# Patient Record
Sex: MALE | Race: WHITE | NOT HISPANIC OR LATINO | Employment: FULL TIME | URBAN - METROPOLITAN AREA
[De-identification: names, ages, dates, MRNs, and addresses within clinical notes are randomized per-mention and may not be internally consistent; named-entity substitution may affect disease eponyms.]

---

## 2018-01-29 ENCOUNTER — OFFICE VISIT (OUTPATIENT)
Dept: URGENT CARE | Facility: CLINIC | Age: 73
End: 2018-01-29
Payer: COMMERCIAL

## 2018-01-29 VITALS
HEIGHT: 69 IN | TEMPERATURE: 98 F | RESPIRATION RATE: 18 BRPM | WEIGHT: 194 LBS | OXYGEN SATURATION: 97 % | SYSTOLIC BLOOD PRESSURE: 133 MMHG | BODY MASS INDEX: 28.73 KG/M2 | HEART RATE: 79 BPM | DIASTOLIC BLOOD PRESSURE: 91 MMHG

## 2018-01-29 DIAGNOSIS — R05.9 COUGH: ICD-10-CM

## 2018-01-29 DIAGNOSIS — B34.9 VIRAL SYNDROME: Primary | ICD-10-CM

## 2018-01-29 LAB
CTP QC/QA: YES
FLUAV AG NPH QL: NEGATIVE
FLUBV AG NPH QL: NEGATIVE

## 2018-01-29 PROCEDURE — 87804 INFLUENZA ASSAY W/OPTIC: CPT | Mod: QW,S$GLB,, | Performed by: FAMILY MEDICINE

## 2018-01-29 PROCEDURE — 99203 OFFICE O/P NEW LOW 30 MIN: CPT | Mod: S$GLB,,, | Performed by: FAMILY MEDICINE

## 2018-01-29 RX ORDER — ASPIRIN 81 MG/1
81 TABLET ORAL DAILY
COMMUNITY

## 2018-01-29 RX ORDER — OSELTAMIVIR PHOSPHATE 75 MG/1
75 CAPSULE ORAL 2 TIMES DAILY
Qty: 10 CAPSULE | Refills: 0 | Status: SHIPPED | OUTPATIENT
Start: 2018-01-29 | End: 2018-02-03

## 2018-01-29 RX ORDER — CODEINE PHOSPHATE AND GUAIFENESIN 10; 100 MG/5ML; MG/5ML
5 SOLUTION ORAL 3 TIMES DAILY PRN
Qty: 120 ML | Refills: 0 | Status: SHIPPED | OUTPATIENT
Start: 2018-01-29 | End: 2018-02-05

## 2018-01-29 NOTE — PATIENT INSTRUCTIONS
"  Viral Syndrome (Adult)  A viral illness may cause a number of symptoms. The symptoms depend on the part of the body that the virus affects. If it settles in your nose, throat, and lungs, it may cause cough, sore throat, congestion, and sometimes headache. If it settles in your stomach and intestinal tract, it may cause vomiting and diarrhea. Sometimes it causes vague symptoms like "aching all over," feeling tired, loss of appetite, or fever.  A viral illness usually lasts 1 to 2 weeks, but sometimes it lasts longer. In some cases, a more serious infection can look like a viral syndrome in the first few days of the illness. You may need another exam and additional tests to know the difference. Watch for the warning signs listed below.  Home care  Follow these guidelines for taking care of yourself at home:  · If symptoms are severe, rest at home for the first 2 to 3 days.  · Stay away from cigarette smoke - both your smoke and the smoke from others.  · You may use over-the-counter acetaminophen or ibuprofen for fever, muscle aching, and headache, unless another medicine was prescribed for this. If you have chronic liver or kidney disease or ever had a stomach ulcer or GI bleeding, talk with your doctor before using these medicines. No one who is younger than 18 and ill with a fever should take aspirin. It may cause severe disease or death.  · Your appetite may be poor, so a light diet is fine. Avoid dehydration by drinking 8 to 12 8-ounce glasses of fluids each day. This may include water; orange juice; lemonade; apple, grape, and cranberry juice; clear fruit drinks; electrolyte replacement and sports drinks; and decaffeinated teas and coffee. If you have been diagnosed with a kidney disease, ask your doctor how much and what types of fluids you should drink to prevent dehydration. If you have kidney disease, drinking too much fluid can cause it build up in the your body and be dangerous to your " health.  · Over-the-counter remedies won't shorten the length of the illness but may be helpful for cough, sore throat; and nasal and sinus congestion. Don't use decongestants if you have high blood pressure.  Follow-up care  Follow up with your healthcare provider if you do not improve over the next week.  Call 911  Get emergency medical care if any of the following occur:  · Convulsion  · Feeling weak, dizzy, or like you are going to faint  · Chest pain, shortness of breath, wheezing, or difficulty breathing  When to seek medical advice  Call your healthcare provider right away if any of these occur:  · Cough with lots of colored sputum (mucus) or blood in your sputum  · Chest pain, shortness of breath, wheezing, or difficulty breathing  · Severe headache; face, neck, or ear pain  · Severe, constant pain in the lower right side of your belly (abdominal)  · Continued vomiting (cant keep liquids down)  · Frequent diarrhea (more than 5 times a day); blood (red or black color) or mucus in diarrhea  · Feeling weak, dizzy, or like you are going to faint  · Extreme thirst  · Fever of 100.4°F (38°C) or higher, or as directed by your healthcare provider  Date Last Reviewed: 9/25/2015  © 4923-0926 [employer]. [address] [place] [address], [place] [address]. All rights reserved. This information is not intended as a substitute for professional medical care. Always follow your healthcare professional's instructions.    Follow up with your doctor in a few days as needed.  Return to the urgent care or go to the ER if symptoms get worse.    Antwan Edwards MD

## 2018-01-29 NOTE — PROGRESS NOTES
"Subjective:       Patient ID: Alfonso Plascencia is a 72 y.o. male.    Vitals:  height is 5' 9" (1.753 m) and weight is 88 kg (194 lb). His oral temperature is 98.4 °F (36.9 °C). His blood pressure is 133/91 (abnormal) and his pulse is 79. His respiration is 18 and oxygen saturation is 97%.     Chief Complaint: URI    URI    This is a new problem. The current episode started in the past 7 days. The problem has been gradually worsening. There has been no fever. Associated symptoms include congestion, coughing, joint pain, rhinorrhea and sneezing. Pertinent negatives include no abdominal pain, chest pain, diarrhea, ear pain, headaches, joint swelling, nausea, neck pain, plugged ear sensation, rash, sinus pain, sore throat, swollen glands, vomiting or wheezing. Treatments tried: Delsym, benadryl. The treatment provided no relief.     Review of Systems   Constitution: Positive for weakness and malaise/fatigue. Negative for chills and fever.   HENT: Positive for congestion, rhinorrhea and sneezing. Negative for ear discharge, ear pain, hoarse voice, sinus pain and sore throat.    Eyes: Negative for discharge, pain and redness.   Cardiovascular: Negative for chest pain, dyspnea on exertion and leg swelling.   Respiratory: Positive for cough. Negative for shortness of breath, sputum production and wheezing.    Skin: Negative for rash.   Musculoskeletal: Positive for joint pain. Negative for myalgias and neck pain.   Gastrointestinal: Negative for abdominal pain, constipation, diarrhea, nausea and vomiting.   Neurological: Negative for headaches.       Objective:      Physical Exam   Constitutional: He is oriented to person, place, and time. He appears well-developed and well-nourished.   HENT:   Head: Normocephalic and atraumatic.   Right Ear: External ear normal.   Left Ear: External ear normal.   Mouth/Throat: Oropharynx is clear and moist.   Eyes: EOM are normal. Pupils are equal, round, and reactive to light.   Neck: Normal " range of motion. Neck supple. No JVD present. No tracheal deviation present. No thyromegaly present.   Cardiovascular: Normal rate, regular rhythm and normal heart sounds.  Exam reveals no gallop and no friction rub.    No murmur heard.  Pulmonary/Chest: Breath sounds normal. No respiratory distress. He has no wheezes. He has no rales. He exhibits no tenderness.   Abdominal: Soft. Bowel sounds are normal. He exhibits no distension and no mass. There is no tenderness. There is no rebound and no guarding. No hernia.   Musculoskeletal: Normal range of motion. He exhibits no edema, tenderness or deformity.   Lymphadenopathy:     He has no cervical adenopathy.   Neurological: He is alert and oriented to person, place, and time. He displays normal reflexes. No cranial nerve deficit. He exhibits normal muscle tone. Coordination normal.   Skin: Skin is warm. Capillary refill takes less than 2 seconds. No rash noted. No erythema. No pallor.   Psychiatric: He has a normal mood and affect. His behavior is normal. Judgment and thought content normal.   Vitals reviewed.      Assessment:       1. Viral syndrome    2. Cough        Plan:         Viral syndrome  -     oseltamivir (TAMIFLU) 75 MG capsule; Take 1 capsule (75 mg total) by mouth 2 (two) times daily.  Dispense: 10 capsule; Refill: 0    Cough  -     POCT Influenza A/B  -     guaifenesin-codeine 100-10 mg/5 ml (CHERATUSSIN AC)  mg/5 mL syrup; Take 5 mLs by mouth 3 (three) times daily as needed for Cough.  Dispense: 120 mL; Refill: 0      Follow up with your doctor in a few days as needed.  Return to the urgent care or go to the ER if symptoms get worse.    Antwan Edwards MD

## 2021-01-10 ENCOUNTER — IMMUNIZATION (OUTPATIENT)
Dept: INTERNAL MEDICINE | Facility: CLINIC | Age: 76
End: 2021-01-10
Payer: COMMERCIAL

## 2021-01-10 DIAGNOSIS — Z23 NEED FOR VACCINATION: ICD-10-CM

## 2021-01-10 PROCEDURE — 91300 COVID-19, MRNA, LNP-S, PF, 30 MCG/0.3 ML DOSE VACCINE: CPT | Mod: PBBFAC | Performed by: FAMILY MEDICINE

## 2021-01-31 ENCOUNTER — IMMUNIZATION (OUTPATIENT)
Dept: INTERNAL MEDICINE | Facility: CLINIC | Age: 76
End: 2021-01-31
Payer: COMMERCIAL

## 2021-01-31 DIAGNOSIS — Z23 NEED FOR VACCINATION: Primary | ICD-10-CM

## 2021-01-31 PROCEDURE — 91300 COVID-19, MRNA, LNP-S, PF, 30 MCG/0.3 ML DOSE VACCINE: CPT | Mod: PBBFAC | Performed by: FAMILY MEDICINE

## 2021-01-31 PROCEDURE — 0002A COVID-19, MRNA, LNP-S, PF, 30 MCG/0.3 ML DOSE VACCINE: CPT | Mod: PBBFAC | Performed by: FAMILY MEDICINE

## 2021-03-18 ENCOUNTER — PATIENT MESSAGE (OUTPATIENT)
Dept: RESEARCH | Facility: HOSPITAL | Age: 76
End: 2021-03-18

## 2021-03-26 ENCOUNTER — PATIENT MESSAGE (OUTPATIENT)
Dept: RESEARCH | Facility: HOSPITAL | Age: 76
End: 2021-03-26

## 2021-10-16 ENCOUNTER — OFFICE VISIT (OUTPATIENT)
Dept: URGENT CARE | Facility: CLINIC | Age: 76
End: 2021-10-16
Payer: COMMERCIAL

## 2021-10-16 VITALS
SYSTOLIC BLOOD PRESSURE: 141 MMHG | HEIGHT: 69 IN | OXYGEN SATURATION: 97 % | TEMPERATURE: 99 F | RESPIRATION RATE: 17 BRPM | WEIGHT: 200 LBS | HEART RATE: 85 BPM | BODY MASS INDEX: 29.62 KG/M2 | DIASTOLIC BLOOD PRESSURE: 84 MMHG

## 2021-10-16 DIAGNOSIS — R05.9 COUGH: ICD-10-CM

## 2021-10-16 DIAGNOSIS — J06.9 UPPER RESPIRATORY TRACT INFECTION, UNSPECIFIED TYPE: Primary | ICD-10-CM

## 2021-10-16 LAB
CTP QC/QA: YES
SARS-COV-2 RDRP RESP QL NAA+PROBE: NEGATIVE

## 2021-10-16 PROCEDURE — 3079F PR MOST RECENT DIASTOLIC BLOOD PRESSURE 80-89 MM HG: ICD-10-PCS | Mod: CPTII,S$GLB,, | Performed by: NURSE PRACTITIONER

## 2021-10-16 PROCEDURE — 1159F PR MEDICATION LIST DOCUMENTED IN MEDICAL RECORD: ICD-10-PCS | Mod: CPTII,S$GLB,, | Performed by: NURSE PRACTITIONER

## 2021-10-16 PROCEDURE — 3079F DIAST BP 80-89 MM HG: CPT | Mod: CPTII,S$GLB,, | Performed by: NURSE PRACTITIONER

## 2021-10-16 PROCEDURE — 3077F PR MOST RECENT SYSTOLIC BLOOD PRESSURE >= 140 MM HG: ICD-10-PCS | Mod: CPTII,S$GLB,, | Performed by: NURSE PRACTITIONER

## 2021-10-16 PROCEDURE — 1159F MED LIST DOCD IN RCRD: CPT | Mod: CPTII,S$GLB,, | Performed by: NURSE PRACTITIONER

## 2021-10-16 PROCEDURE — 99213 OFFICE O/P EST LOW 20 MIN: CPT | Mod: S$GLB,CS,, | Performed by: NURSE PRACTITIONER

## 2021-10-16 PROCEDURE — U0002 COVID-19 LAB TEST NON-CDC: HCPCS | Mod: QW,S$GLB,, | Performed by: NURSE PRACTITIONER

## 2021-10-16 PROCEDURE — 3077F SYST BP >= 140 MM HG: CPT | Mod: CPTII,S$GLB,, | Performed by: NURSE PRACTITIONER

## 2021-10-16 PROCEDURE — 3008F PR BODY MASS INDEX (BMI) DOCUMENTED: ICD-10-PCS | Mod: CPTII,S$GLB,, | Performed by: NURSE PRACTITIONER

## 2021-10-16 PROCEDURE — U0002: ICD-10-PCS | Mod: QW,S$GLB,, | Performed by: NURSE PRACTITIONER

## 2021-10-16 PROCEDURE — 99213 PR OFFICE/OUTPT VISIT, EST, LEVL III, 20-29 MIN: ICD-10-PCS | Mod: S$GLB,CS,, | Performed by: NURSE PRACTITIONER

## 2021-10-16 PROCEDURE — 3008F BODY MASS INDEX DOCD: CPT | Mod: CPTII,S$GLB,, | Performed by: NURSE PRACTITIONER

## 2021-10-16 RX ORDER — AMLODIPINE BESYLATE 5 MG/1
5 TABLET ORAL DAILY
COMMUNITY

## 2021-10-16 RX ORDER — OMEPRAZOLE 10 MG/1
10 CAPSULE, DELAYED RELEASE ORAL DAILY
COMMUNITY

## 2021-10-16 RX ORDER — MELOXICAM 7.5 MG/1
7.5 TABLET ORAL DAILY
COMMUNITY

## 2021-10-16 RX ORDER — FLUTICASONE PROPIONATE 50 MCG
1 SPRAY, SUSPENSION (ML) NASAL DAILY
Qty: 9.9 ML | Refills: 0 | Status: SHIPPED | OUTPATIENT
Start: 2021-10-16

## 2021-12-29 ENCOUNTER — IMMUNIZATION (OUTPATIENT)
Dept: PRIMARY CARE CLINIC | Facility: CLINIC | Age: 76
End: 2021-12-29
Payer: COMMERCIAL

## 2021-12-29 DIAGNOSIS — Z23 NEED FOR VACCINATION: Primary | ICD-10-CM

## 2021-12-29 PROCEDURE — 0004A COVID-19, MRNA, LNP-S, PF, 30 MCG/0.3 ML DOSE VACCINE: CPT | Mod: CV19,PBBFAC | Performed by: INTERNAL MEDICINE

## 2024-07-22 ENCOUNTER — OFFICE VISIT (OUTPATIENT)
Dept: OPHTHALMOLOGY | Facility: CLINIC | Age: 79
End: 2024-07-22
Payer: COMMERCIAL

## 2024-07-22 DIAGNOSIS — H50.10 INTERMITTENT EXOTROPIA OF BOTH EYES: Primary | ICD-10-CM

## 2024-07-22 PROCEDURE — 1101F PT FALLS ASSESS-DOCD LE1/YR: CPT | Mod: CPTII,S$GLB,, | Performed by: STUDENT IN AN ORGANIZED HEALTH CARE EDUCATION/TRAINING PROGRAM

## 2024-07-22 PROCEDURE — 1126F AMNT PAIN NOTED NONE PRSNT: CPT | Mod: CPTII,S$GLB,, | Performed by: STUDENT IN AN ORGANIZED HEALTH CARE EDUCATION/TRAINING PROGRAM

## 2024-07-22 PROCEDURE — 99999 PR PBB SHADOW E&M-EST. PATIENT-LVL III: CPT | Mod: PBBFAC,,, | Performed by: STUDENT IN AN ORGANIZED HEALTH CARE EDUCATION/TRAINING PROGRAM

## 2024-07-22 PROCEDURE — 3288F FALL RISK ASSESSMENT DOCD: CPT | Mod: CPTII,S$GLB,, | Performed by: STUDENT IN AN ORGANIZED HEALTH CARE EDUCATION/TRAINING PROGRAM

## 2024-07-22 PROCEDURE — 1160F RVW MEDS BY RX/DR IN RCRD: CPT | Mod: CPTII,S$GLB,, | Performed by: STUDENT IN AN ORGANIZED HEALTH CARE EDUCATION/TRAINING PROGRAM

## 2024-07-22 PROCEDURE — 99204 OFFICE O/P NEW MOD 45 MIN: CPT | Mod: S$GLB,,, | Performed by: STUDENT IN AN ORGANIZED HEALTH CARE EDUCATION/TRAINING PROGRAM

## 2024-07-22 PROCEDURE — 1159F MED LIST DOCD IN RCRD: CPT | Mod: CPTII,S$GLB,, | Performed by: STUDENT IN AN ORGANIZED HEALTH CARE EDUCATION/TRAINING PROGRAM

## 2024-07-22 PROCEDURE — 92060 SENSORIMOTOR EXAMINATION: CPT | Mod: S$GLB,,, | Performed by: STUDENT IN AN ORGANIZED HEALTH CARE EDUCATION/TRAINING PROGRAM

## 2024-07-22 NOTE — PROGRESS NOTES
"HPI     Strabismus            Comments: Referred by Dr. Rojas Clark           Comments    Alfonso Plascencia is a 79 y.o. male who comes in for an evaluation of   strabismus. Dr. Clark noted decreased medial rectus of right eye. Pt   states that objects/images on the left field of vision, shifts the right.   States that it feels like his eyes are drifting outwards. Notes   intermittent double vision. Denies blurry vision, eye pain, headaches,   F/F.     Eye meds: none  POHx.:  Myopia OS,   Hyperopia OD   Bilateral presbyopia   PVD OU  Ocular migraine               Last edited by Clementina Campbell MA on 7/22/2024  2:18 PM.        ROS    Negative for: Constitutional, Gastrointestinal, Neurological, Skin,   Genitourinary, Musculoskeletal, HENT, Endocrine, Cardiovascular, Eyes,   Respiratory, Psychiatric, Allergic/Imm, Heme/Lymph  Last edited by Nicola Hancock MD on 7/22/2024  4:12 PM.        Assessment /Plan     For exam results, see Encounter Report.    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 Borderline control at distance and good control at near    Plan:  Discussed options of observation given borderline control vs strabismus surgery. Patient increasingly bothered by symptoms of difficulty focusing and intermittent diplopia, would like to proceed with surgery    Discussed risks and benefits of strabismus surgery  Benefits include: realign eyes and improved binocularity  Risks include: Pain, bleeding infection, transient or permanent redness, less attractive appearance, decreased vision, loss of vision, undercorrection, overcorrection, double vision, " need for future surgery    Approved for surgery  Will tentatively plan for [facility]                Nicola Hancock MD  Pediatric Ophthalmology and Adult Strabismus  [facility]

## 2024-07-22 NOTE — ASSESSMENT & PLAN NOTE
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 Borderline control at distance and good control at near    Plan:  Discussed options of observation given borderline control vs strabismus surgery. Patient increasingly bothered by symptoms of difficulty focusing and intermittent diplopia, would like to proceed with surgery    Discussed risks and benefits of strabismus surgery  Benefits include: realign eyes and improved binocularity  Risks include: Pain, bleeding infection, transient or permanent redness, less attractive appearance, decreased vision, loss of vision, undercorrection, overcorrection, double vision, need for future surgery    Approved for surgery  Will tentatively plan for [facility]     "

## 2024-07-24 ENCOUNTER — TELEPHONE (OUTPATIENT)
Dept: OPHTHALMOLOGY | Facility: CLINIC | Age: 79
End: 2024-07-24
Payer: COMMERCIAL

## 2024-07-24 DIAGNOSIS — H50.10 INTERMITTENT EXOTROPIA OF BOTH EYES: Primary | ICD-10-CM

## 2024-09-09 NOTE — PRE-PROCEDURE INSTRUCTIONS
PreOp Instructions given:   - Verbal medication information (what to hold and what to take)   - NPO guidelines 2400   - Arrival place directions given; time to be given the day before procedure by the   Surgeon's Office [facility]  - Bathing with antibacterial soap   - Don't wear any jewelry or bring any valuables AM of surgery   - No makeup or moisturizer to face   - No perfume/cologne, powder, lotions or aftershave   Pt. verbalized understanding.   Pt denies any h/o Anesthesia/Sedation complications or side effects.  Patient does not know arrival time.  Explained that this information comes from the surgeon's office and if they haven't heard from them by 2 or 3 pm to call the office.  Patient stated an understanding.

## 2024-10-08 ENCOUNTER — ANESTHESIA EVENT (OUTPATIENT)
Dept: SURGERY | Facility: HOSPITAL | Age: 79
End: 2024-10-08
Payer: COMMERCIAL

## 2024-10-08 ENCOUNTER — TELEPHONE (OUTPATIENT)
Dept: OPHTHALMOLOGY | Facility: CLINIC | Age: 79
End: 2024-10-08
Payer: COMMERCIAL

## 2024-10-09 ENCOUNTER — ANESTHESIA (OUTPATIENT)
Dept: SURGERY | Facility: HOSPITAL | Age: 79
End: 2024-10-09
Payer: COMMERCIAL

## 2024-10-09 ENCOUNTER — HOSPITAL ENCOUNTER (OUTPATIENT)
Facility: HOSPITAL | Age: 79
Discharge: HOME | End: 2024-10-09
Attending: STUDENT IN AN ORGANIZED HEALTH CARE EDUCATION/TRAINING PROGRAM | Admitting: STUDENT IN AN ORGANIZED HEALTH CARE EDUCATION/TRAINING PROGRAM
Payer: COMMERCIAL

## 2024-10-09 VITALS
DIASTOLIC BLOOD PRESSURE: 69 MMHG | OXYGEN SATURATION: 96 % | TEMPERATURE: 98 F | BODY MASS INDEX: 28.44 KG/M2 | WEIGHT: 192 LBS | SYSTOLIC BLOOD PRESSURE: 118 MMHG | HEIGHT: 69 IN | HEART RATE: 65 BPM | RESPIRATION RATE: 18 BRPM

## 2024-10-09 DIAGNOSIS — H50.10 INTERMITTENT EXOTROPIA OF BOTH EYES: Primary | ICD-10-CM

## 2024-10-09 DIAGNOSIS — H50.10 EXOTROPIA: ICD-10-CM

## 2024-10-09 PROCEDURE — 36000706: Performed by: STUDENT IN AN ORGANIZED HEALTH CARE EDUCATION/TRAINING PROGRAM

## 2024-10-09 PROCEDURE — 25000003 PHARM REV CODE 250: Performed by: STUDENT IN AN ORGANIZED HEALTH CARE EDUCATION/TRAINING PROGRAM

## 2024-10-09 PROCEDURE — 67311 REVISE EYE MUSCLE: CPT | Mod: 50,,, | Performed by: STUDENT IN AN ORGANIZED HEALTH CARE EDUCATION/TRAINING PROGRAM

## 2024-10-09 PROCEDURE — 27200651 HC AIRWAY, LMA: Performed by: STUDENT IN AN ORGANIZED HEALTH CARE EDUCATION/TRAINING PROGRAM

## 2024-10-09 PROCEDURE — 37000008 HC ANESTHESIA 1ST 15 MINUTES: Performed by: STUDENT IN AN ORGANIZED HEALTH CARE EDUCATION/TRAINING PROGRAM

## 2024-10-09 PROCEDURE — 63600175 PHARM REV CODE 636 W HCPCS

## 2024-10-09 PROCEDURE — 71000015 HC POSTOP RECOV 1ST HR: Performed by: STUDENT IN AN ORGANIZED HEALTH CARE EDUCATION/TRAINING PROGRAM

## 2024-10-09 PROCEDURE — 25000003 PHARM REV CODE 250

## 2024-10-09 PROCEDURE — 71000044 HC DOSC ROUTINE RECOVERY FIRST HOUR: Performed by: STUDENT IN AN ORGANIZED HEALTH CARE EDUCATION/TRAINING PROGRAM

## 2024-10-09 PROCEDURE — 37000009 HC ANESTHESIA EA ADD 15 MINS: Performed by: STUDENT IN AN ORGANIZED HEALTH CARE EDUCATION/TRAINING PROGRAM

## 2024-10-09 PROCEDURE — 36000707: Performed by: STUDENT IN AN ORGANIZED HEALTH CARE EDUCATION/TRAINING PROGRAM

## 2024-10-09 RX ORDER — ONDANSETRON HYDROCHLORIDE 2 MG/ML
INJECTION, SOLUTION INTRAVENOUS
Status: DISCONTINUED | OUTPATIENT
Start: 2024-10-09 | End: 2024-10-09

## 2024-10-09 RX ORDER — NEOMYCIN SULFATE, POLYMYXIN B SULFATE, AND DEXAMETHASONE 3.5; 10000; 1 MG/G; [USP'U]/G; MG/G
OINTMENT OPHTHALMIC
Status: DISCONTINUED
Start: 2024-10-09 | End: 2024-10-09 | Stop reason: HOSPADM

## 2024-10-09 RX ORDER — SODIUM CHLORIDE 0.9 % (FLUSH) 0.9 %
10 SYRINGE (ML) INJECTION
Status: DISCONTINUED | OUTPATIENT
Start: 2024-10-09 | End: 2024-10-09 | Stop reason: HOSPADM

## 2024-10-09 RX ORDER — PROPOFOL 10 MG/ML
VIAL (ML) INTRAVENOUS
Status: DISCONTINUED | OUTPATIENT
Start: 2024-10-09 | End: 2024-10-09

## 2024-10-09 RX ORDER — HYDROCODONE BITARTRATE AND ACETAMINOPHEN 5; 325 MG/1; MG/1
1 TABLET ORAL EVERY 6 HOURS PRN
Qty: 12 TABLET | Refills: 0 | Status: SHIPPED | OUTPATIENT
Start: 2024-10-09

## 2024-10-09 RX ORDER — PHENYLEPHRINE HYDROCHLORIDE 10 MG/ML
INJECTION INTRAVENOUS
Status: DISCONTINUED | OUTPATIENT
Start: 2024-10-09 | End: 2024-10-09

## 2024-10-09 RX ORDER — NEOMYCIN SULFATE, POLYMYXIN B SULFATE, AND DEXAMETHASONE 3.5; 10000; 1 MG/G; [USP'U]/G; MG/G
OINTMENT OPHTHALMIC
Status: DISCONTINUED | OUTPATIENT
Start: 2024-10-09 | End: 2024-10-09 | Stop reason: HOSPADM

## 2024-10-09 RX ORDER — FENTANYL CITRATE 50 UG/ML
INJECTION, SOLUTION INTRAMUSCULAR; INTRAVENOUS
Status: DISCONTINUED | OUTPATIENT
Start: 2024-10-09 | End: 2024-10-09

## 2024-10-09 RX ORDER — LIDOCAINE HYDROCHLORIDE 20 MG/ML
INJECTION INTRAVENOUS
Status: DISCONTINUED | OUTPATIENT
Start: 2024-10-09 | End: 2024-10-09

## 2024-10-09 RX ORDER — HYDROCODONE BITARTRATE AND ACETAMINOPHEN 5; 325 MG/1; MG/1
1 TABLET ORAL ONCE
Status: DISCONTINUED | OUTPATIENT
Start: 2024-10-09 | End: 2024-10-09 | Stop reason: HOSPADM

## 2024-10-09 RX ORDER — PHENYLEPHRINE HYDROCHLORIDE 25 MG/ML
SOLUTION/ DROPS OPHTHALMIC
Status: DISCONTINUED
Start: 2024-10-09 | End: 2024-10-09 | Stop reason: HOSPADM

## 2024-10-09 RX ORDER — PHENYLEPHRINE HYDROCHLORIDE 25 MG/ML
SOLUTION/ DROPS OPHTHALMIC
Status: DISCONTINUED | OUTPATIENT
Start: 2024-10-09 | End: 2024-10-09 | Stop reason: HOSPADM

## 2024-10-09 RX ORDER — DEXAMETHASONE SODIUM PHOSPHATE 4 MG/ML
INJECTION, SOLUTION INTRA-ARTICULAR; INTRALESIONAL; INTRAMUSCULAR; INTRAVENOUS; SOFT TISSUE
Status: DISCONTINUED | OUTPATIENT
Start: 2024-10-09 | End: 2024-10-09

## 2024-10-09 RX ADMIN — FENTANYL CITRATE 25 MCG: 50 INJECTION, SOLUTION INTRAMUSCULAR; INTRAVENOUS at 10:10

## 2024-10-09 RX ADMIN — ONDANSETRON 4 MG: 2 INJECTION INTRAMUSCULAR; INTRAVENOUS at 10:10

## 2024-10-09 RX ADMIN — FENTANYL CITRATE 25 MCG: 50 INJECTION, SOLUTION INTRAMUSCULAR; INTRAVENOUS at 09:10

## 2024-10-09 RX ADMIN — PROPOFOL 30 MG: 10 INJECTION, EMULSION INTRAVENOUS at 09:10

## 2024-10-09 RX ADMIN — SODIUM CHLORIDE: 9 INJECTION, SOLUTION INTRAVENOUS at 09:10

## 2024-10-09 RX ADMIN — PROPOFOL 170 MG: 10 INJECTION, EMULSION INTRAVENOUS at 09:10

## 2024-10-09 RX ADMIN — PHENYLEPHRINE HYDROCHLORIDE 100 MCG: 10 INJECTION INTRAVENOUS at 10:10

## 2024-10-09 RX ADMIN — DEXAMETHASONE SODIUM PHOSPHATE 4 MG: 4 INJECTION, SOLUTION INTRAMUSCULAR; INTRAVENOUS at 09:10

## 2024-10-09 RX ADMIN — LIDOCAINE HYDROCHLORIDE 100 MG: 20 INJECTION INTRAVENOUS at 09:10

## 2024-10-09 NOTE — DISCHARGE SUMMARY
Discharge Summary  Ophthalmology Service    Admit Date: 10/9/2024     Discharge Date: 11/22/2023     Attending Physician: Nicola Hancock MD     Discharge Physician: same as above    Discharged Condition: Stable    Reason for Admission:   Intermittent exotropia of both eyes [H50.10]  Exotropia [H50.10]     Treatments/Procedures: Strabismus surgery (see dictated report for details).    Hospital Course: Stable    Consults: None    Significant Diagnostic Studies: None    Disposition: Home    Patient Instructions:   - Resume same diet as prior to surgery  - No swimming for 1 weeks   - Do not get dirt in the eye(s) for 1 weeks   - Resume activity as tolerated  - Apply a thin ribbon of Maxitrol ointment to the eye(s) 3 times per day for 7 days  - Apply ice packs to surgical eye(s) for 72 hours as tolerated  - Call the Ophthalmology clinic to schedule an appointment with Dr. Hancock in 1 week.    Patient Instructions:   Current Discharge Medication List        START taking these medications    Details   HYDROcodone-acetaminophen (NORCO) 5-325 mg per tablet Take 1 tablet by mouth every 6 (six) hours as needed for Pain.  Qty: 12 tablet, Refills: 0    Comments: Quantity prescribed more than 7 day supply? No           CONTINUE these medications which have NOT CHANGED    Details   amLODIPine (NORVASC) 5 MG tablet Take 5 mg by mouth once daily.      aspirin (ECOTRIN) 81 MG EC tablet Take 81 mg by mouth once daily.      FAMOTIDINE (PEPCID ORAL) Take by mouth.      fluticasone propionate (FLONASE) 50 mcg/actuation nasal spray 1 spray (50 mcg total) by Each Nostril route once daily.  Qty: 9.9 mL, Refills: 0    Associated Diagnoses: Cough; Upper respiratory tract infection, unspecified type      meloxicam (MOBIC) 7.5 MG tablet Take 7.5 mg by mouth once daily.      omeprazole (PRILOSEC) 10 MG capsule Take 10 mg by mouth once daily.             No discharge procedures on file.

## 2024-10-09 NOTE — H&P
"Ophthalmology History and Physical     Patient Name:  Alfonso Plascencia  MRN:  [id number]  :  1945    Allergies:  Patient has no known allergies.    CC:  Here for Surgery    HPI:  Patient presenting for strabismus surgery.    Interval History: No significant changes to past medical history, allergies, or medications.    ROS:  No fevers, chills, chest pain, shortness of breath, nausea or emesis         Past Medical History:   Diagnosis Date    GERD (gastroesophageal reflux disease)      No family history on file.  Past Surgical History:   Procedure Laterality Date    APPENDECTOMY      CATARACT EXTRACTION Right     JOINT REPLACEMENT         Medications marked as taking:  [unfilled]    Vital Signs:  /83 (BP Location: Left arm, Patient Position: Lying)   Pulse 82   Temp 98 °F (36.7 °C) (Temporal)   Resp 16   Ht 5' 9" (1.753 m)   Wt 87.1 kg (192 lb)   SpO2 97%   BMI 28.35 kg/m²     Ophthalmology Exam:  Per last ophthalmology clinic note    Heart Exam: As per anesthesia    Lung Exam:  As per anesthesia    Assessment and Plan:     Alfonso Plascencia is a 79 y.o. male presenting for strabismus surgery.    -Written consent present   -Plan for bilateral surgery  -Plan to proceed to OR as planned    "

## 2024-10-09 NOTE — ANESTHESIA POSTPROCEDURE EVALUATION
Anesthesia Post Evaluation    Patient: Alfonso Plascencia    Procedure(s) Performed: Procedure(s) (LRB):  STRABISMUS SURGERY (Bilateral)    Final Anesthesia Type: general      Patient location during evaluation: PACU  Patient participation: Yes- Able to Participate  Level of consciousness: awake and alert  Post-procedure vital signs: reviewed and stable  Pain management: adequate  Airway patency: patent    PONV status at discharge: No PONV  Anesthetic complications: no      Cardiovascular status: blood pressure returned to baseline  Respiratory status: unassisted  Hydration status: euvolemic  Follow-up not needed.              Vitals Value Taken Time   /69 10/09/24 1215   Temp 36.9 °C (98.4 °F) 10/09/24 1100   Pulse 65 10/09/24 1215   Resp 18 10/09/24 1215   SpO2 96 % 10/09/24 1215         No case tracking events are documented in the log.      Pain/Som Score: Som Score: 9 (10/9/2024 11:15 AM)

## 2024-10-09 NOTE — ANESTHESIA PROCEDURE NOTES
1/15/2020    Luis Cox  [address] [place] [address]    Dear Luis,    We have been unable to reach you by phone. We received a refill request for atorvastatin. Previously Dr. Yoder had not been filling your atorvastatin. You have not had a lipid panel here since June, 2018. If you would like Dr. Yoder can refill atorvastatin. If you want him to do that then we need you to confirm what dose of atorvastatin you are on so that we prescribe the right dose. We will also need to know which pharmacy you would like this to go to. You should have a fasting lipid panel done within 2 weeks so we can see how his lipids are doing. Please give our office a call with this information.        Sincerely,      Jax Yoder M.D  [facility]  [id number] [facility] [place]  [address]  [phone]   Intubation    Date/Time: 10/9/2024 9:38 AM    Performed by: Liz Powell CRNA  Authorized by: Penelope Irby MD    Intubation:     Induction:  Intravenous    Intubated:  Postinduction    Mask Ventilation:  N/a    Attempts:  1    Attempted By:  CRNA    Method of Intubation:  Fast track LMA    Difficult Airway Encountered?: No      Complications:  None    Airway Device:  Supraglottic airway/LMA    Airway Device Size:  5.0 (Air Q LMA)    Style/Cuff Inflation:  Cuffed (inflated to minimal occlusive pressure)    Placement Verified By:  Capnometry    Complicating Factors:  None    Findings Post-Intubation:  BS equal bilateral and atraumatic/condition of teeth unchanged

## 2024-10-09 NOTE — ANESTHESIA PREPROCEDURE EVALUATION
10/09/2024  Alfonso Plascencia is a 79 y.o., male.      Pre-op Assessment    I have reviewed the Patient Summary Reports.     I have reviewed the Nursing Notes. I have reviewed the NPO Status.   I have reviewed the Medications.     Review of Systems  Anesthesia Hx:             Denies Family Hx of Anesthesia complications.    Denies Personal Hx of Anesthesia complications.                    Cardiovascular:            Denies Angina.   Denies Orthopnea.     Denies CRAIN.                            Pulmonary:    Denies COPD.                     Hepatic/GI:     GERD, well controlled             Neurological:    Denies Neuromuscular Disease.   Denies Seizures.                                Endocrine:  Denies Diabetes.               Physical Exam  General: Well nourished    Airway:  Mallampati: II / II  Mouth Opening: Normal  TM Distance: Normal  Tongue: Normal        Anesthesia Plan  Type of Anesthesia, risks & benefits discussed:    Anesthesia Type: Gen Supraglottic Airway, Gen ETT  Intra-op Monitoring Plan: Standard ASA Monitors  Post Op Pain Control Plan: IV/PO Opioids PRN and multimodal analgesia  Induction:  IV  Airway Plan: Direct, Post-Induction  Informed Consent: Informed consent signed with the Patient and all parties understand the risks and agree with anesthesia plan.  All questions answered.   ASA Score: 2  Day of Surgery Review of History & Physical: H&P Update referred to the surgeon/provider.I have interviewed and examined the patient. I have reviewed the patient's H&P dated: There are no significant changes.     Ready For Surgery From Anesthesia Perspective.     .

## 2024-10-09 NOTE — PLAN OF CARE
Patient is stable and ready for discharge. Instructions and medications given to patient and family. Questions answered. Patient tolerating po liquids with no difficulty. Patient has no pain or states it is a tolerable level for them. Anesthesia consent and surgical consent in chart.

## 2024-10-09 NOTE — DISCHARGE INSTRUCTIONS
ACTIVITY LEVEL:  If you received sedation or an anesthetic, you may feel sleepy for several hours. Rest until you are more awake. Gradually resume your normal activities in two days. Children may return to school in 3-4 days. It is all right to watch television or to read. Swimming is permitted in two weeks.    CARE OF INCISION:  A blood-tinged discharge from the eye is normal. This can be gently washed away with a clean, damp wash cloth. Do not use water, gauze, or cotton to wipe the lids. The morning after surgery, you may have difficulty opening your eyes. This is normal. If dry blood or secretions are holding the lids together, you may open the eyes by gently  the lids from above and below. Please wash your hands thoroughly before doing this. If the lids dont open, do not force them apart. (A child will cry and the tears will soften the secretions and a parent can try again later.) Use cool compresses to the eyes for 24 hours, if tolerated for comfort. Place maxitrol ointment in eyes three times per day for 7 days     BATHING:  Keep your face out of water for seven days after surgery    DIET:  At home, continue with liquids, and if there is no nausea, you may eat a soft diet. Gradually resume your normal diet.    PAIN:  If needed for discomfort, you can use cold compresses and take Tylenol (usual recommended dose) every four hours. Generally, do not take Tylenol more than four times a day.  If you were prescribed a narcotic, you may take as prescribed.     WHEN TO CALL THE DOCTOR:   Any increase in the amount of swelling of the eyes and adjacent tissues   Heavy yellow discharge from the eyes   Fever over 101ºF (38.4ºC)    A purple discoloration of the lower lids is common. It appears a few days after surgery and does not affect healing. You may experience double vision after surgery. This is normal and should disappear in a few days or weeks. Prescription glasses may be worn unless otherwise  instructed. The eyes may be unusually sensitive to light for several days. Dark sunglasses will help.    FOR EMERGENCIES:  If any unusual problems or difficulties occur, contact Dr. Hancock or the resident at [phone] (page ) or at the Clinic office, [phone].

## 2024-10-09 NOTE — OP NOTE
Patient Name: Alfonso Plascencia  Date of Surgery: 10/9/2024  Medical Record Number: [id number]  : 1945    Surgeon: Nicola Hancock MD  Assistant: Kurt Clements MD    Pre-op Diagnosis:  1. Exotropia, both eyes    Post-op Diagnosis:  1. Exotropia, both eyes    Procedure:  1. Bilateral lateral rectus recession, 5.5 mm     Anesthesia: General  Complications: none    INDICATION OF PROCEDURE  Alfonso Plascencia is a 79 y.o. male with history of alternating exotropia. The strabismus has become progressively more difficult to control. The patient was identified in the main operating room holding area. The patient's parents were advised of the risks and benefits of surgical intervention, elected to proceed, and signed an informed consent document.    DESCRIPTION OF PROCEDURE  The patient was identified in the pre-operative holding area and brought to the operating room, where anesthesia monitoring was established, and general anesthesia induced in the supine position. The area about both eyes was prepped and draped in the usual sterile fashion and a drop of 2.5% phenylephrine was applied to both eyes.    Attention was turned to the right eye. An eyelid speculum placed in the right eye. 6-0 silk traction sutures were placed at the 12 and 6 o'clock position at the limbus. A 90 degree conjunctival peritomy was created temporally. Dissection was carried out in the superior temporal and inferior temporal quadrants to reveal bare sclera. A Painting hook, followed by a Green hook, were used to engage the right lateral rectus muscle. Overlying Tenon's attachments were severed with Emerald scissors. A double-armed 6-0 Vicryl suture was passed through the muscle tendon near its insertion with a central loop and locking bites at both poles. The muscle was then severed from the globe using Emerald scissors. Locking forceps were applied to both poles of the original muscle insertion, and the globe positioned in adduction. The Vicryl  sutures were passed at one half-scleral depth in a crossed-swords fashion 5.5 mm posterior to the original muscle insertion as measured with calipers. The muscle was tied down to the globe and found stable in its new position. The traction sutures were removed. The conjunctiva was closed with interrupted sutures of 6-0 plain gut.    Then attention was directed to the left eye, where an identical left lateral rectus recession of 5.5 mm as described above was performed. The eyelid speculum was then removed. A drop of dilute Betadine solution was placed in both eyes followed by Maxitrol ophthalmic ointment. The patient was awakened from anesthesia and taken to the postoperative recovery area in stable condition, having tolerated the procedure well.    Dr. Hancock was present, scrubbed and participated for the entirety of this procedure.

## 2024-10-09 NOTE — PLAN OF CARE
Discharge instructions reviewed with pt and pt's wife at bedside. Verbalized understanding. Packet given. Prescription sent to outside pharmacy.

## 2024-10-09 NOTE — TRANSFER OF CARE
"Anesthesia Transfer of Care Note    Patient: Alfonso Plascencia    Procedure(s) Performed: Procedure(s) (LRB):  STRABISMUS SURGERY (Bilateral)    Patient location: PACU    Anesthesia Type: general    Transport from OR: Transported from OR on 6-10 L/min O2 by face mask with adequate spontaneous ventilation    Post pain: adequate analgesia    Post assessment: no apparent anesthetic complications and tolerated procedure well    Post vital signs: stable    Level of consciousness: sedated    Nausea/Vomiting: no nausea/vomiting    Complications: none    Transfer of care protocol was followed      Last vitals: Visit Vitals  /76 (BP Location: Left arm, Patient Position: Lying)   Pulse 82   Temp 36.7 °C (98 °F) (Temporal)   Resp 16   Ht 5' 9" (1.753 m)   Wt 87.1 kg (192 lb)   SpO2 97%   BMI 28.35 kg/m²     "

## 2024-10-11 ENCOUNTER — TELEPHONE (OUTPATIENT)
Dept: OPHTHALMOLOGY | Facility: CLINIC | Age: 79
End: 2024-10-11
Payer: COMMERCIAL

## 2024-10-11 NOTE — TELEPHONE ENCOUNTER
Patient is doing well one day after strabismus surgery. Patient reports pain is controlled and vision is at baseline. I advised to continue maxitrol ointment as prescribed. Will see in one week as scheduled or sooner PRN.      Nicola Hancock MD  Pediatric Ophthalmology and Adult Strabismus  [facility]

## 2024-10-18 ENCOUNTER — OFFICE VISIT (OUTPATIENT)
Dept: OPHTHALMOLOGY | Facility: CLINIC | Age: 79
End: 2024-10-18
Payer: COMMERCIAL

## 2024-10-18 DIAGNOSIS — H50.10 INTERMITTENT EXOTROPIA OF BOTH EYES: ICD-10-CM

## 2024-10-18 DIAGNOSIS — Z98.890 POST-OPERATIVE STATE: Primary | ICD-10-CM

## 2024-10-18 PROCEDURE — 99999 PR PBB SHADOW E&M-EST. PATIENT-LVL II: CPT | Mod: PBBFAC,,, | Performed by: STUDENT IN AN ORGANIZED HEALTH CARE EDUCATION/TRAINING PROGRAM

## 2024-10-18 RX ORDER — NEOMYCIN SULFATE, POLYMYXIN B SULFATE, AND DEXAMETHASONE 3.5; 10000; 1 MG/G; [USP'U]/G; MG/G
OINTMENT OPHTHALMIC 2 TIMES DAILY
Qty: 3.5 G | Refills: 1 | Status: SHIPPED | OUTPATIENT
Start: 2024-10-18

## 2024-10-18 NOTE — ASSESSMENT & PLAN NOTE
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 Borderline control at distance and good control at near      Plan:  Discussed options of observation given borderline control vs strabismus surgery. Patient increasingly bothered by symptoms of difficulty focusing and intermittent diplopia, would like to proceed with surgery    10/9/24 - strab surgery - BLRc 5.5mm OU     POW1: Eye healing well ; alignment  still with persistent convergence insufficiency pattern with larger deviation at near  Overall better control    Plan:   Taper maxitrol BID for 1 week, then once daily for one week then stop  Can resume normal activities  RTC 1 month    "

## 2024-10-18 NOTE — PROGRESS NOTES
"HPI     Post-op Evaluation            Comments: 10/09/2024 S/p Bilateral lateral rectus recession, 5.5 mm            Comments    Alfonso Plascencia is a 79 y.o. male who comes in for 1 week post op follow up.   Pt reports mild irritation. Reports improvement with eye misalignment. He   states sometimes it feels like his eyes are wanting to pull out but   doesn't noticed. Also having some difficulty finding the right distance to   read.    Eye meds: Maxitrol TID                Last edited by Nicola Hancock MD on 10/18/2024  9:12 AM.        ROS    Negative for: Constitutional, Gastrointestinal, Neurological, Skin,   Genitourinary, Musculoskeletal, HENT, Endocrine, Cardiovascular, Eyes,   Respiratory, Psychiatric, Allergic/Imm, Heme/Lymph  Last edited by Nicola Hancock MD on 10/18/2024  9:12 AM.        Assessment /Plan     For exam results, see Encounter Report.    Post-operative state    Intermittent exotropia of both eyes    Other orders  -     neomycin-polymyxin-dexamethasone (MAXITROL) 3.5 mg/g-10,000 unit/g-0.1 % Oint; Place into both eyes 2 (two) times a day.  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 Borderline control at distance and good control at near      Plan:  Discussed options of observation given borderline control vs strabismus surgery. Patient increasingly bothered by symptoms of difficulty focusing and intermittent diplopia, would like to proceed with surgery    10/9/24 - strab surgery - BLRc 5.5mm OU     POW1: Eye healing well ; alignment  still with persistent convergence insufficiency pattern with larger deviation at near  Overall better " control    Plan:   Taper maxitrol BID for 1 week, then once daily for one week then stop  Can resume normal activities  RTC 1 month            Other Visit Diagnoses       Post-operative state    -  Primary           Nicola Hancock MD  Pediatric Ophthalmology and Adult Strabismus  [facility]

## 2024-11-15 ENCOUNTER — OFFICE VISIT (OUTPATIENT)
Dept: OPHTHALMOLOGY | Facility: CLINIC | Age: 79
End: 2024-11-15
Payer: COMMERCIAL

## 2024-11-15 DIAGNOSIS — Z98.890 POST-OPERATIVE STATE: ICD-10-CM

## 2024-11-15 DIAGNOSIS — H50.10 INTERMITTENT EXOTROPIA OF BOTH EYES: Primary | ICD-10-CM

## 2024-11-15 PROCEDURE — 99999 PR PBB SHADOW E&M-EST. PATIENT-LVL III: CPT | Mod: PBBFAC,,, | Performed by: STUDENT IN AN ORGANIZED HEALTH CARE EDUCATION/TRAINING PROGRAM

## 2024-11-15 NOTE — ASSESSMENT & PLAN NOTE
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 Borderline control at distance and good control at near      Plan:  Discussed options of observation given borderline control vs strabismus surgery. Patient increasingly bothered by symptoms of difficulty focusing and intermittent diplopia, would like to proceed with surgery    10/9/24 - strab surgery - BLRc 5.5mm OU     POM1: Small undercorrection - Alignment stable with persistent convergence insufficiency pattern with larger deviation at near  Overall better control and patient reports improved symptoms    Plan:   Good outcome from surgery  RTC strabismus PRN    "

## 2024-11-15 NOTE — PROGRESS NOTES
"HPI    Pt is here today for 1 month post op.  He states he is feeling a pulling sensation in the right eye. There is no   double vision at this time. Overall,he feels like his symptoms are   improved from prior to surgery    No Current Eye Meds  Last edited by Nicola Hancock MD on 11/15/2024  9:49 AM.        ROS    Negative for: Constitutional, Gastrointestinal, Neurological, Skin,   Genitourinary, Musculoskeletal, HENT, Endocrine, Cardiovascular, Eyes,   Respiratory, Psychiatric, Allergic/Imm, Heme/Lymph  Last edited by Nicola Hancock MD on 11/15/2024  9:49 AM.        Assessment /Plan     For exam results, see Encounter Report.    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 Borderline control at distance and good control at near      Plan:  Discussed options of observation given borderline control vs strabismus surgery. Patient increasingly bothered by symptoms of difficulty focusing and intermittent diplopia, would like to proceed with surgery    10/9/24 - strab surgery - BLRc 5.5mm OU     POM1: Small undercorrection - Alignment stable with persistent convergence insufficiency pattern with larger deviation at near  Overall better control and patient reports improved symptoms    Plan:   Good outcome from surgery  RTC strabismus PRN            Other Visit Diagnoses       Post-operative state               Nicola Hancock MD  Pediatric Ophthalmology and Adult Strabismus  [facility]                   "

## (undated) DEVICE — DRAPE STRABISMUS STRL 40X48IN

## (undated) DEVICE — CORD BIPOLAR 12 FOOT

## (undated) DEVICE — SUT 6/0 18IN PLAIN GUT D/A

## (undated) DEVICE — TRAY MUSCLE LID EYE

## (undated) DEVICE — SUT SILK 6-0 TG140-8 18IN

## (undated) DEVICE — DRAPE THREE-QTR REINF 53X77IN

## (undated) DEVICE — SUT 6/0 18IN COATED VICRYL

## (undated) DEVICE — FORCEP CURVED DISP

## (undated) DEVICE — DRAPE EENT SPLIT STERILE

## (undated) DEVICE — DRESSING TRANS 2X2 TEGADERM

## (undated) DEVICE — DRAPE TOP 53X102IN

## (undated) DEVICE — MARKER SKIN FINE TIP

## (undated) DEVICE — SOL BETADINE 5%